# Patient Record
Sex: FEMALE | Race: WHITE | NOT HISPANIC OR LATINO | Employment: OTHER | URBAN - METROPOLITAN AREA
[De-identification: names, ages, dates, MRNs, and addresses within clinical notes are randomized per-mention and may not be internally consistent; named-entity substitution may affect disease eponyms.]

---

## 2022-09-26 ENCOUNTER — TELEPHONE (OUTPATIENT)
Dept: SCHEDULING | Facility: IMAGING CENTER | Age: 67
End: 2022-09-26

## 2022-09-29 SDOH — HEALTH STABILITY: PHYSICAL HEALTH: ON AVERAGE, HOW MANY MINUTES DO YOU ENGAGE IN EXERCISE AT THIS LEVEL?: 60 MIN

## 2022-09-29 SDOH — HEALTH STABILITY: MENTAL HEALTH
STRESS IS WHEN SOMEONE FEELS TENSE, NERVOUS, ANXIOUS, OR CAN'T SLEEP AT NIGHT BECAUSE THEIR MIND IS TROUBLED. HOW STRESSED ARE YOU?: NOT AT ALL

## 2022-09-29 SDOH — HEALTH STABILITY: PHYSICAL HEALTH: ON AVERAGE, HOW MANY DAYS PER WEEK DO YOU ENGAGE IN MODERATE TO STRENUOUS EXERCISE (LIKE A BRISK WALK)?: 5 DAYS

## 2022-09-29 SDOH — ECONOMIC STABILITY: TRANSPORTATION INSECURITY
IN THE PAST 12 MONTHS, HAS LACK OF TRANSPORTATION KEPT YOU FROM MEETINGS, WORK, OR FROM GETTING THINGS NEEDED FOR DAILY LIVING?: NO

## 2022-09-29 SDOH — ECONOMIC STABILITY: FOOD INSECURITY: WITHIN THE PAST 12 MONTHS, THE FOOD YOU BOUGHT JUST DIDN'T LAST AND YOU DIDN'T HAVE MONEY TO GET MORE.: NEVER TRUE

## 2022-09-29 SDOH — ECONOMIC STABILITY: INCOME INSECURITY: IN THE LAST 12 MONTHS, WAS THERE A TIME WHEN YOU WERE NOT ABLE TO PAY THE MORTGAGE OR RENT ON TIME?: NO

## 2022-09-29 SDOH — ECONOMIC STABILITY: HOUSING INSECURITY: IN THE LAST 12 MONTHS, HOW MANY PLACES HAVE YOU LIVED?: 1

## 2022-09-29 SDOH — ECONOMIC STABILITY: FOOD INSECURITY: WITHIN THE PAST 12 MONTHS, YOU WORRIED THAT YOUR FOOD WOULD RUN OUT BEFORE YOU GOT MONEY TO BUY MORE.: NEVER TRUE

## 2022-09-29 SDOH — ECONOMIC STABILITY: INCOME INSECURITY: HOW HARD IS IT FOR YOU TO PAY FOR THE VERY BASICS LIKE FOOD, HOUSING, MEDICAL CARE, AND HEATING?: NOT HARD AT ALL

## 2022-09-29 SDOH — ECONOMIC STABILITY: HOUSING INSECURITY
IN THE LAST 12 MONTHS, WAS THERE A TIME WHEN YOU DID NOT HAVE A STEADY PLACE TO SLEEP OR SLEPT IN A SHELTER (INCLUDING NOW)?: NO

## 2022-09-29 SDOH — ECONOMIC STABILITY: TRANSPORTATION INSECURITY
IN THE PAST 12 MONTHS, HAS THE LACK OF TRANSPORTATION KEPT YOU FROM MEDICAL APPOINTMENTS OR FROM GETTING MEDICATIONS?: NO

## 2022-09-29 SDOH — ECONOMIC STABILITY: TRANSPORTATION INSECURITY
IN THE PAST 12 MONTHS, HAS LACK OF RELIABLE TRANSPORTATION KEPT YOU FROM MEDICAL APPOINTMENTS, MEETINGS, WORK OR FROM GETTING THINGS NEEDED FOR DAILY LIVING?: NO

## 2022-09-29 ASSESSMENT — SOCIAL DETERMINANTS OF HEALTH (SDOH)
IN A TYPICAL WEEK, HOW MANY TIMES DO YOU TALK ON THE PHONE WITH FAMILY, FRIENDS, OR NEIGHBORS?: MORE THAN THREE TIMES A WEEK
DO YOU BELONG TO ANY CLUBS OR ORGANIZATIONS SUCH AS CHURCH GROUPS UNIONS, FRATERNAL OR ATHLETIC GROUPS, OR SCHOOL GROUPS?: NO
HOW HARD IS IT FOR YOU TO PAY FOR THE VERY BASICS LIKE FOOD, HOUSING, MEDICAL CARE, AND HEATING?: NOT HARD AT ALL
IN A TYPICAL WEEK, HOW MANY TIMES DO YOU TALK ON THE PHONE WITH FAMILY, FRIENDS, OR NEIGHBORS?: MORE THAN THREE TIMES A WEEK
DO YOU BELONG TO ANY CLUBS OR ORGANIZATIONS SUCH AS CHURCH GROUPS UNIONS, FRATERNAL OR ATHLETIC GROUPS, OR SCHOOL GROUPS?: NO
HOW OFTEN DO YOU GET TOGETHER WITH FRIENDS OR RELATIVES?: TWICE A WEEK
WITHIN THE PAST 12 MONTHS, YOU WORRIED THAT YOUR FOOD WOULD RUN OUT BEFORE YOU GOT THE MONEY TO BUY MORE: NEVER TRUE
HOW OFTEN DO YOU HAVE SIX OR MORE DRINKS ON ONE OCCASION: NEVER
HOW OFTEN DO YOU ATTEND CHURCH OR RELIGIOUS SERVICES?: NEVER
HOW OFTEN DO YOU ATTEND CHURCH OR RELIGIOUS SERVICES?: NEVER
HOW OFTEN DO YOU GET TOGETHER WITH FRIENDS OR RELATIVES?: TWICE A WEEK
HOW MANY DRINKS CONTAINING ALCOHOL DO YOU HAVE ON A TYPICAL DAY WHEN YOU ARE DRINKING: 1 OR 2
HOW OFTEN DO YOU ATTENT MEETINGS OF THE CLUB OR ORGANIZATION YOU BELONG TO?: NEVER
HOW OFTEN DO YOU HAVE A DRINK CONTAINING ALCOHOL: 2-3 TIMES A WEEK
HOW OFTEN DO YOU ATTENT MEETINGS OF THE CLUB OR ORGANIZATION YOU BELONG TO?: NEVER

## 2022-09-29 ASSESSMENT — LIFESTYLE VARIABLES
HOW OFTEN DO YOU HAVE SIX OR MORE DRINKS ON ONE OCCASION: NEVER
HOW MANY STANDARD DRINKS CONTAINING ALCOHOL DO YOU HAVE ON A TYPICAL DAY: 1 OR 2
HOW OFTEN DO YOU HAVE A DRINK CONTAINING ALCOHOL: 2-3 TIMES A WEEK
AUDIT-C TOTAL SCORE: 3
SKIP TO QUESTIONS 9-10: 1

## 2022-09-30 ENCOUNTER — HOSPITAL ENCOUNTER (OUTPATIENT)
Dept: LAB | Facility: MEDICAL CENTER | Age: 67
End: 2022-09-30
Payer: COMMERCIAL

## 2022-09-30 ENCOUNTER — OFFICE VISIT (OUTPATIENT)
Dept: MEDICAL GROUP | Facility: PHYSICIAN GROUP | Age: 67
End: 2022-09-30
Payer: COMMERCIAL

## 2022-09-30 VITALS
HEART RATE: 67 BPM | SYSTOLIC BLOOD PRESSURE: 124 MMHG | RESPIRATION RATE: 18 BRPM | DIASTOLIC BLOOD PRESSURE: 82 MMHG | HEIGHT: 62 IN | TEMPERATURE: 97.6 F | BODY MASS INDEX: 34.3 KG/M2 | WEIGHT: 186.4 LBS | OXYGEN SATURATION: 97 %

## 2022-09-30 DIAGNOSIS — Z23 NEED FOR VACCINATION: ICD-10-CM

## 2022-09-30 DIAGNOSIS — Z98.84 HISTORY OF LAPAROSCOPIC ADJUSTABLE GASTRIC BANDING: ICD-10-CM

## 2022-09-30 DIAGNOSIS — E66.9 OBESITY (BMI 30-39.9): ICD-10-CM

## 2022-09-30 DIAGNOSIS — Z00.00 WELLNESS EXAMINATION: ICD-10-CM

## 2022-09-30 DIAGNOSIS — Z11.59 NEED FOR HEPATITIS C SCREENING TEST: ICD-10-CM

## 2022-09-30 DIAGNOSIS — R09.81 SINUS CONGESTION: ICD-10-CM

## 2022-09-30 DIAGNOSIS — Z13.79 GENETIC SCREENING: ICD-10-CM

## 2022-09-30 DIAGNOSIS — Z12.31 ENCOUNTER FOR SCREENING MAMMOGRAM FOR BREAST CANCER: ICD-10-CM
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
## 2022-09-30 DIAGNOSIS — Z91.89 AT RISK FOR BREAST CANCER: ICD-10-CM

## 2022-09-30 LAB
25(OH)D3 SERPL-MCNC: 25 NG/ML (ref 30–100)
ALBUMIN SERPL BCP-MCNC: 4.3 G/DL (ref 3.2–4.9)
ALBUMIN/GLOB SERPL: 1.5 G/DL
ALP SERPL-CCNC: 87 U/L (ref 30–99)
ALT SERPL-CCNC: 19 U/L (ref 2–50)
ANION GAP SERPL CALC-SCNC: 13 MMOL/L (ref 7–16)
AST SERPL-CCNC: 19 U/L (ref 12–45)
BILIRUB SERPL-MCNC: 0.3 MG/DL (ref 0.1–1.5)
BUN SERPL-MCNC: 11 MG/DL (ref 8–22)
CALCIUM SERPL-MCNC: 9.6 MG/DL (ref 8.5–10.5)
CHLORIDE SERPL-SCNC: 109 MMOL/L (ref 96–112)
CHOLEST SERPL-MCNC: 234 MG/DL (ref 100–199)
CO2 SERPL-SCNC: 21 MMOL/L (ref 20–33)
CREAT SERPL-MCNC: 0.65 MG/DL (ref 0.5–1.4)
ERYTHROCYTE [DISTWIDTH] IN BLOOD BY AUTOMATED COUNT: 46.1 FL (ref 35.9–50)
EST. AVERAGE GLUCOSE BLD GHB EST-MCNC: 105 MG/DL
FASTING STATUS PATIENT QL REPORTED: NORMAL
GFR SERPLBLD CREATININE-BSD FMLA CKD-EPI: 97 ML/MIN/1.73 M 2
GLOBULIN SER CALC-MCNC: 2.8 G/DL (ref 1.9–3.5)
GLUCOSE SERPL-MCNC: 94 MG/DL (ref 65–99)
HBA1C MFR BLD: 5.3 % (ref 4–5.6)
HCT VFR BLD AUTO: 41.2 % (ref 37–47)
HCV AB SER QL: NORMAL
HDLC SERPL-MCNC: 75 MG/DL
HGB BLD-MCNC: 14.1 G/DL (ref 12–16)
LDLC SERPL CALC-MCNC: 137 MG/DL
MCH RBC QN AUTO: 33.2 PG (ref 27–33)
MCHC RBC AUTO-ENTMCNC: 34.2 G/DL (ref 33.6–35)
MCV RBC AUTO: 96.9 FL (ref 81.4–97.8)
PLATELET # BLD AUTO: 271 K/UL (ref 164–446)
PMV BLD AUTO: 11.3 FL (ref 9–12.9)
POTASSIUM SERPL-SCNC: 4.1 MMOL/L (ref 3.6–5.5)
PROT SERPL-MCNC: 7.1 G/DL (ref 6–8.2)
RBC # BLD AUTO: 4.25 M/UL (ref 4.2–5.4)
SODIUM SERPL-SCNC: 143 MMOL/L (ref 135–145)
TRIGL SERPL-MCNC: 112 MG/DL (ref 0–149)
TSH SERPL DL<=0.005 MIU/L-ACNC: 0.84 UIU/ML (ref 0.38–5.33)
WBC # BLD AUTO: 7.1 K/UL (ref 4.8–10.8)

## 2022-09-30 PROCEDURE — 90677 PCV20 VACCINE IM: CPT

## 2022-09-30 PROCEDURE — 85027 COMPLETE CBC AUTOMATED: CPT

## 2022-09-30 PROCEDURE — 80053 COMPREHEN METABOLIC PANEL: CPT

## 2022-09-30 PROCEDURE — 86803 HEPATITIS C AB TEST: CPT

## 2022-09-30 PROCEDURE — 90715 TDAP VACCINE 7 YRS/> IM: CPT

## 2022-09-30 PROCEDURE — 80061 LIPID PANEL: CPT

## 2022-09-30 PROCEDURE — 84443 ASSAY THYROID STIM HORMONE: CPT

## 2022-09-30 PROCEDURE — 90471 IMMUNIZATION ADMIN: CPT

## 2022-09-30 PROCEDURE — 82306 VITAMIN D 25 HYDROXY: CPT

## 2022-09-30 PROCEDURE — 36415 COLL VENOUS BLD VENIPUNCTURE: CPT

## 2022-09-30 PROCEDURE — 90472 IMMUNIZATION ADMIN EACH ADD: CPT

## 2022-09-30 PROCEDURE — 83036 HEMOGLOBIN GLYCOSYLATED A1C: CPT

## 2022-09-30 PROCEDURE — 99204 OFFICE O/P NEW MOD 45 MIN: CPT | Mod: 25

## 2022-09-30 RX ORDER — AMOXICILLIN AND CLAVULANATE POTASSIUM 875; 125 MG/1; MG/1
1 TABLET, FILM COATED ORAL 2 TIMES DAILY
Qty: 14 TABLET | Refills: 0 | Status: SHIPPED | OUTPATIENT
Start: 2022-09-30

## 2022-09-30 RX ORDER — TRIAMCINOLONE ACETONIDE 1 MG/G
1 CREAM TOPICAL 2 TIMES DAILY
Qty: 15 G | Refills: 1 | Status: SHIPPED | OUTPATIENT
Start: 2022-09-30

## 2022-09-30 ASSESSMENT — ENCOUNTER SYMPTOMS
FEVER: 0
BACK PAIN: 0
SINUS PAIN: 1
DEPRESSION: 0
DIZZINESS: 0
HEADACHES: 0
DIARRHEA: 0
SHORTNESS OF BREATH: 0
HEARTBURN: 0
COUGH: 0
PALPITATIONS: 0
ABDOMINAL PAIN: 0
BLURRED VISION: 0
NERVOUS/ANXIOUS: 0
CHILLS: 0
ROS SKIN COMMENTS: BUG BITES
CONSTIPATION: 0
MYALGIAS: 0

## 2022-09-30 ASSESSMENT — PATIENT HEALTH QUESTIONNAIRE - PHQ9: CLINICAL INTERPRETATION OF PHQ2 SCORE: 0

## 2022-09-30 NOTE — ASSESSMENT & PLAN NOTE
Acute, onset Wednesday 9/28, has noticed multiple bug bites to areas missed by bug spray, to right neck, forehead, and left clavicle area. Intensely pruritic, raised areas of erythema, with erythematous papule/bite, in clusters. Has applied benadryl cream, ice with mild temporary relief.  Recommend 24 hour non drowsy allergy medication: claritin, zyrtec, or allegra  Topical kenalog cream for itching.

## 2022-09-30 NOTE — ASSESSMENT & PLAN NOTE
Chronic, improving.  Patient reports that her heaviest she was 336 pounds.  She has had Lap-Band surgery, continues to make healthy lifestyle choices including increasing exercise, activity as well as making healthy nutritional choices.  I congratulated her for her efforts and weight loss and encouraged her continued work.

## 2022-09-30 NOTE — PROGRESS NOTES
"Subjective:     CC: Patient presents today to establish care.  Reports she has been unable to establish with a primary care provider for over 2 years.  She lives in [place] ranch with her , and has children with family in [place].  Reports she has had routine screening for colonoscopy, mammograms, DEXA scan within the last 3 years in [place] and is working on getting those records.    HPI:   Beverly presents today with    Problem   Sinus Congestion   Bug Bites   Obesity (Bmi 30-39.9)       Health Maintenance: Completed  Diet: variety of fresh veggies/fruits, lean meats, limited fast food/junk food/soda  Exercise: very physically active, chopping wood, tending to ranch  Substance Abuse: no  Safe in relationship. Yes,   Seat belts, Sun protection used.    ROS:  Review of Systems   Constitutional:  Negative for chills and fever.   HENT:  Positive for sinus pain. Negative for congestion and hearing loss.    Eyes:  Negative for blurred vision.   Respiratory:  Negative for cough and shortness of breath.    Cardiovascular:  Negative for chest pain and palpitations.   Gastrointestinal:  Negative for abdominal pain, constipation, diarrhea and heartburn.   Genitourinary:  Negative for dysuria.   Musculoskeletal:  Negative for back pain and myalgias.   Skin:  Positive for itching (Secondary to bug bites). Negative for rash.        Bug bites   Neurological:  Negative for dizziness and headaches.   Psychiatric/Behavioral:  Negative for depression. The patient is not nervous/anxious.    All other systems reviewed and are negative.    Objective:     Exam:  /82 (BP Location: Left arm, Patient Position: Sitting, BP Cuff Size: Large adult)   Pulse 67   Temp 36.4 °C (97.6 °F) (Temporal)   Resp 18   Ht 1.57 m (5' 1.81\")   Wt 84.6 kg (186 lb 6.4 oz)   SpO2 97%   BMI 34.30 kg/m²  Body mass index is 34.3 kg/m².    Physical Exam  Vitals reviewed.   HENT:      Head: Normocephalic and atraumatic.      " Comments: Severe tenderness to touch frontal and maxillary sinus     Right Ear: Tympanic membrane, ear canal and external ear normal.      Left Ear: Tympanic membrane, ear canal and external ear normal.      Nose:      Comments: Mask in place     Mouth/Throat:      Comments: Mask in place  Eyes:      Extraocular Movements: Extraocular movements intact.      Conjunctiva/sclera: Conjunctivae normal.      Pupils: Pupils are equal, round, and reactive to light.      Comments: Preseptal edema and erythema   Neck:      Thyroid: No thyromegaly.      Trachea: Trachea normal.   Cardiovascular:      Rate and Rhythm: Normal rate and regular rhythm.      Pulses: Normal pulses.      Heart sounds: Normal heart sounds. No murmur heard.  Pulmonary:      Effort: Pulmonary effort is normal. No respiratory distress.      Breath sounds: Normal breath sounds.   Abdominal:      General: Bowel sounds are normal.   Musculoskeletal:         General: No swelling, tenderness or deformity. Normal range of motion.   Lymphadenopathy:      Cervical: No cervical adenopathy.   Skin:     General: Skin is warm and dry.      Capillary Refill: Capillary refill takes less than 2 seconds.   Neurological:      General: No focal deficit present.      Mental Status: She is alert and oriented to person, place, and time.      Cranial Nerves: No cranial nerve deficit.      Sensory: No sensory deficit.      Motor: No weakness.   Psychiatric:         Mood and Affect: Mood normal.         Behavior: Behavior normal.       Assessment & Plan:     67 y.o. female with the following -     Problem List Items Addressed This Visit       Sinus congestion     Onset this morning. No allergy symptoms. Tender to touch sinus and under eyes. No cough, sob, fevers, or exposures to known illness. Feels like sinus infection experienced in the past.  Has tried nasal saline rinses for this which helped get some yellowish mucus out.  Conservative management for a few days: nasal  saline, otc allergy medication, and nasal steroid twice daily.  Patient reports she prefers not to take antibiotics, due to her remote location I will prescribe antibiotic if conservative therapy is not effective.     If no relief ok to take antibiotic: amox/clav 875/125 bid for 5-7 days.         Relevant Medications    amoxicillin-clavulanate (AUGMENTIN) 875-125 MG Tab    Bug bites     Acute, onset Wednesday 9/28, has noticed multiple bug bites to areas missed by bug spray, to right neck, forehead, and left clavicle area. Intensely pruritic, raised areas of erythema, with erythematous papule/bite, in clusters. Has applied benadryl cream, ice with mild temporary relief.  Recommend 24 hour non drowsy allergy medication: claritin, zyrtec, or allegra  Topical kenalog cream for itching.          Relevant Medications    triamcinolone acetonide (KENALOG) 0.1 % Cream    Obesity (BMI 30-39.9)     Chronic, improving.  Patient reports that her heaviest she was 336 pounds.  She has had Lap-Band surgery, continues to make healthy lifestyle choices including increasing exercise, activity as well as making healthy nutritional choices.  I congratulated her for her efforts and weight loss and encouraged her continued work.          Relevant Orders    Patient identified as having weight management issue.  Appropriate orders and counseling given.    HEMOGLOBIN A1C    LIPID PANEL     Other Visit Diagnoses       Encounter for screening mammogram for breast cancer        Relevant Orders    MA-SCREENING MAMMO BILAT W/TOMOSYNTHESIS W/CAD    At risk for breast cancer        Genetic screening        Relevant Orders    Referral to Genetic Research Studies    Wellness examination        Relevant Orders    HEMOGLOBIN A1C    VITAMIN D,25 HYDROXY (DEFICIENCY)    TSH    Comp Metabolic Panel    CBC WITHOUT DIFFERENTIAL    LIPID PANEL    History of laparoscopic adjustable gastric banding        Relevant Orders    Comp Metabolic Panel    CBC WITHOUT  DIFFERENTIAL    Need for hepatitis C screening test        Relevant Orders    HEP C VIRUS ANTIBODY    Need for vaccination        Relevant Orders    Tdap =>6yo IM (Completed)    Pneumococcal Conjugate Vaccine 20-Valent (19 yrs+) (Completed)          I have placed the below orders and discussed them with an approved delegating provider.  The MA is performing the below orders under the direction of Dr. Sierra.    I spent a total of 48 minutes with record review, exam, communication with the patient, communication with other providers, and documentation of this encounter.    Return in about 1 year (around 9/30/2023).    Please note that this dictation was created using voice recognition software. I have made every reasonable attempt to correct obvious errors, but I expect that there are errors of grammar and possibly content that I did not discover before finalizing the note.

## 2022-09-30 NOTE — ASSESSMENT & PLAN NOTE
Onset this morning. No allergy symptoms. Tender to touch sinus and under eyes. No cough, sob, fevers, or exposures to known illness. Feels like sinus infection experienced in the past.  Has tried nasal saline rinses for this which helped get some yellowish mucus out.  Conservative management for a few days: nasal saline, otc allergy medication, and nasal steroid twice daily.  Patient reports she prefers not to take antibiotics, due to her remote location I will prescribe antibiotic if conservative therapy is not effective.     If no relief ok to take antibiotic: amox/clav 875/125 bid for 5-7 days.

## 2024-11-18 ENCOUNTER — APPOINTMENT (OUTPATIENT)
Dept: RADIOLOGY | Facility: IMAGING CENTER | Age: 69
End: 2024-11-18
Attending: NURSE PRACTITIONER
Payer: MEDICARE

## 2024-11-18 ENCOUNTER — OFFICE VISIT (OUTPATIENT)
Dept: URGENT CARE | Facility: CLINIC | Age: 69
End: 2024-11-18
Payer: MEDICARE

## 2024-11-18 VITALS
HEART RATE: 94 BPM | BODY MASS INDEX: 32.98 KG/M2 | TEMPERATURE: 97.8 F | WEIGHT: 168 LBS | OXYGEN SATURATION: 96 % | HEIGHT: 60 IN | SYSTOLIC BLOOD PRESSURE: 140 MMHG | RESPIRATION RATE: 18 BRPM | DIASTOLIC BLOOD PRESSURE: 78 MMHG

## 2024-11-18 DIAGNOSIS — S99.921A INJURY OF RIGHT FOOT, INITIAL ENCOUNTER: ICD-10-CM

## 2024-11-18 DIAGNOSIS — S93.601A SPRAIN OF RIGHT FOOT, INITIAL ENCOUNTER: ICD-10-CM

## 2024-11-18 PROCEDURE — 3077F SYST BP >= 140 MM HG: CPT | Performed by: NURSE PRACTITIONER

## 2024-11-18 PROCEDURE — 99213 OFFICE O/P EST LOW 20 MIN: CPT | Performed by: NURSE PRACTITIONER

## 2024-11-18 PROCEDURE — 73630 X-RAY EXAM OF FOOT: CPT | Mod: TC,RT | Performed by: NURSE PRACTITIONER

## 2024-11-18 PROCEDURE — 3078F DIAST BP <80 MM HG: CPT | Performed by: NURSE PRACTITIONER

## 2024-11-18 NOTE — PATIENT INSTRUCTIONS
-Can also take tylenol as directed for pain.  -RICE Therapy: Rest, Ice, Compression, Elevation  -Weight bearing as tolerated. Use crutches until able to walk with normal gait.   -Compression with an elastic bandage for swelling.     Follow up with PCP. Follow up emergently for severe uncontrolled pain, neurovascular compromise (decreased sensation, motion, or circulation).

## 2024-11-18 NOTE — PROGRESS NOTES
"  Subjective:     Beverly Antoine is a 69 y.o. female who presents for Foot Injury (X2wks, right foot injury, painful to walk on, swelling)      Injury occurred on 10/31, when she tripped on rocks. Has right dorsal and medial foot pain. Has intermittent numbness in toes \"with being on it a lot\" and with increased swelling. States the foot gets more swollen towards the end of the day. Pain is 4/10 at rest, 8/10 with , walking. Reports walking on the lateral aspect of her foot for comfort.    Foot Problem  This is a new problem. Associated symptoms include numbness. She has tried acetaminophen for the symptoms.       Past Medical History:   Diagnosis Date    Allergy     Breast cancer (HCC) 2017    cleared       Past Surgical History:   Procedure Laterality Date    HERNIA REPAIR  2014    ABDOMINAL HYSTERECTOMY TOTAL      EYE SURGERY      OTHER ABDOMINAL SURGERY      lap band surgery       Social History     Socioeconomic History    Marital status: Single     Spouse name: Not on file    Number of children: Not on file    Years of education: Not on file    Highest education level: 12th grade   Occupational History    Not on file   Tobacco Use    Smoking status: Never    Smokeless tobacco: Never   Vaping Use    Vaping status: Never Used   Substance and Sexual Activity    Alcohol use: Yes     Alcohol/week: 3.6 oz     Types: 4 Glasses of wine, 2 Shots of liquor per week     Comment: some weeks no Alcohol at all    Drug use: Never    Sexual activity: Yes     Partners: Male     Comment: None   Other Topics Concern    Not on file   Social History Narrative    Not on file     Social Drivers of Health     Financial Resource Strain: Low Risk  (9/29/2022)    Overall Financial Resource Strain (CARDIA)     Difficulty of Paying Living Expenses: Not hard at all   Food Insecurity: No Food Insecurity (9/29/2022)    Hunger Vital Sign     Worried About Running Out of Food in the Last Year: Never true     Ran Out of Food in the Last " Year: Never true   Transportation Needs: No Transportation Needs (2022)    PRAPARE - Transportation     Lack of Transportation (Medical): No     Lack of Transportation (Non-Medical): No   Physical Activity: Sufficiently Active (2022)    Exercise Vital Sign     Days of Exercise per Week: 5 days     Minutes of Exercise per Session: 60 min   Stress: No Stress Concern Present (2022)    [employer] - Occupational Stress Questionnaire     Feeling of Stress : Not at all   Social Connections: Moderately Isolated (2022)    Social Connection and Isolation Panel [NHANES]     Frequency of Communication with Friends and Family: More than three times a week     Frequency of Social Gatherings with Friends and Family: Twice a week     Attends Mandaeism Services: Never     Active Member of Clubs or Organizations: No     Attends Club or Organization Meetings: Never     Marital Status:    Intimate Partner Violence: Not on file   Housing Stability: Low Risk  (2022)    Housing Stability Vital Sign     Unable to Pay for Housing in the Last Year: No     Number of Places Lived in the Last Year: 1     Unstable Housing in the Last Year: No        Family History   Problem Relation Age of Onset    Cancer Mother     Lung Disease Mother             Heart Disease Father             Drug abuse Sister     Heart Disease Brother             Hyperlipidemia Brother     Ovarian Cancer Neg Hx     Tubal Cancer Neg Hx     Peritoneal Cancer Neg Hx     Colorectal Cancer Neg Hx     Breast Cancer Neg Hx     Psychiatric Illness Neg Hx     Diabetes Neg Hx     Hypertension Neg Hx     Stroke Neg Hx         Allergies   Allergen Reactions    Sulfa Drugs Rash     All over body        Review of Systems   Musculoskeletal:  Positive for joint pain.   Neurological:  Positive for sensory change and numbness.   All other systems reviewed and are negative.       Objective:   BP (!) 140/78  (BP Location: Left arm, Patient Position: Sitting, BP Cuff Size: Adult)   Pulse 94   Temp 36.6 °C (97.8 °F) (Temporal)   Resp 18   Ht 1.524 m (5')   Wt 76.2 kg (168 lb)   SpO2 96%   BMI 32.81 kg/m²     Physical Exam  Vitals reviewed.   Constitutional:       General: She is not in acute distress.     Appearance: She is well-developed.   HENT:      Head: Normocephalic and atraumatic.      Right Ear: External ear normal.      Left Ear: External ear normal.      Nose: Nose normal.   Eyes:      Conjunctiva/sclera: Conjunctivae normal.   Cardiovascular:      Rate and Rhythm: Normal rate.      Pulses:           Dorsalis pedis pulses are 2+ on the right side.   Pulmonary:      Effort: Pulmonary effort is normal.   Musculoskeletal:         General: Swelling and tenderness present. Normal range of motion.      Cervical back: Normal range of motion.      Right ankle: No tenderness.      Right Achilles Tendon: No tenderness.      Right foot: Normal capillary refill. Swelling, tenderness and bony tenderness present. No crepitus. Normal pulse.        Feet:       Comments: Right foot: Tenderness to palpation over dorsal 1st-2nd proximal metatarsal. Faint bruising. No arch or 5th metatarsal TTP.    Feet:      Right foot:      Skin integrity: No ulcer, blister, skin breakdown or erythema.   Skin:     General: Skin is warm and dry.      Capillary Refill: Capillary refill takes less than 2 seconds.      Findings: Bruising present. No rash.   Neurological:      Mental Status: She is alert and oriented to person, place, and time.      GCS: GCS eye subscore is 4. GCS verbal subscore is 5. GCS motor subscore is 6.   Psychiatric:         Speech: Speech normal.         Behavior: Behavior normal.         Thought Content: Thought content normal.         Judgment: Judgment normal.         Assessment/Plan:   1. Injury of right foot, initial encounter  - DX-FOOT-COMPLETE 3+ RIGHT; Future  - Referral to Sports Medicine  - naproxen  (NAPROSYN) 375 MG Tab; Take 1 Tablet by mouth 2 times a day with meals for 14 days.  Dispense: 28 Tablet; Refill: 0    2. Sprain of right foot, initial encounter  - Referral to Sports Medicine  - naproxen (NAPROSYN) 375 MG Tab; Take 1 Tablet by mouth 2 times a day with meals for 14 days.  Dispense: 28 Tablet; Refill: 0    Other orders  - Acetaminophen (TYLENOL PO); Take  by mouth.  DX-FOOT-COMPLETE 3+ RIGHT    Result Date: 11/18/2024 11/18/2024 12:38 PM HISTORY/REASON FOR EXAM:  Pain/Deformity Following Trauma TECHNIQUE/EXAM DESCRIPTION AND NUMBER OF VIEWS: 3 views of the RIGHT foot. COMPARISON:  None. FINDINGS: Bone mineralization is age appropriate. Bony alignment is anatomic. There is no evidence of acute fracture or dislocation.     No acute fracture or dislocation is noted.    -Short walking boot.   -Can also take tylenol as directed for pain.  -RICE Therapy: Rest, Ice, Compression, Elevation  -Weight bearing as tolerated. Use crutches until able to walk with normal gait.   -Compression with an elastic bandage for swelling.     Follow up with PCP. Follow up emergently for severe uncontrolled pain, neurovascular compromise (decreased sensation, motion, or circulation).     No fracture noted on imaging. Reports intermittent numbness. Distal neurovascular is currently intact. No achilles deformity or tenderness to palpation.  Discussed initial measures for pain and inflammation, and speciality follow up for persistent symptoms. Has crutches at home.     Differential diagnosis, natural history, supportive care, and indications for immediate follow-up discussed.

## 2024-11-20 ASSESSMENT — ENCOUNTER SYMPTOMS
NUMBNESS: 1
SENSORY CHANGE: 1

## 2025-08-19 ENCOUNTER — APPOINTMENT (OUTPATIENT)
Dept: URGENT CARE | Facility: CLINIC | Age: 70
End: 2025-08-19
Payer: MEDICARE